# Patient Record
Sex: FEMALE | Race: WHITE | NOT HISPANIC OR LATINO | URBAN - METROPOLITAN AREA
[De-identification: names, ages, dates, MRNs, and addresses within clinical notes are randomized per-mention and may not be internally consistent; named-entity substitution may affect disease eponyms.]

---

## 2020-10-12 ENCOUNTER — OFFICE VISIT (OUTPATIENT)
Dept: URBAN - METROPOLITAN AREA CLINIC 83 | Facility: CLINIC | Age: 80
End: 2020-10-12
Payer: MEDICARE

## 2020-10-12 PROCEDURE — 67028 INJECTION EYE DRUG: CPT | Performed by: OPHTHALMOLOGY

## 2020-10-12 PROCEDURE — 92134 CPTRZ OPH DX IMG PST SGM RTA: CPT | Performed by: OPHTHALMOLOGY

## 2020-10-12 ASSESSMENT — INTRAOCULAR PRESSURE
OD: 13
OS: 12

## 2020-10-12 NOTE — IMPRESSION/PLAN
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 Right. Plan: Exam shows macular drusen, however, OCT confirms absence of IRF or SRF. The diagnosis, natural history, and prognosis of non-exudative AMD were discussed. AREDS-2 protocol antioxidant vitamins and Amsler grid self-monitoring were reviewed. The patient was encouraged to avoid smoking, wear UV protection, and call our office immediately upon noticing decreased vision or metamorphopsia.

## 2020-10-12 NOTE — IMPRESSION/PLAN
Impression: Exudative age-related macular degeneration, left eye, with active choroidal neovascularization: H35.3221. Left. s/p Avastin 8/10/20 Plan: Stable SRF/IRF on exam and OCT. I have recommended continuing anti-VEGF treatment at the current treatment interval to maintain vision. The diagnosis, natural history, and prognosis of exudative AMD, as well as the R/B/A of anti-VEGF were discussed. The patient understands that treatment may not improve vision but should reduce the risk of further visual loss. The patient understands that the use of Avastin is off-label and has not been approved for use in the eye. The patient elects to continue intravitreal Avastin treatment OS under OCT guidance. Avastin was performed successfully per protocol without complication. last full DFE OU 8/10/20 RTC 8-9 weeks OCT OU reeval Avastin

## 2020-12-14 ENCOUNTER — OFFICE VISIT (OUTPATIENT)
Dept: URBAN - METROPOLITAN AREA CLINIC 83 | Facility: CLINIC | Age: 80
End: 2020-12-14
Payer: MEDICARE

## 2020-12-14 DIAGNOSIS — H35.3221 EXUDATIVE AGE-RELATED MACULAR DEGENERATION, LEFT EYE, WITH ACTIVE CHOROIDAL NEOVASCULARIZATION: Primary | ICD-10-CM

## 2020-12-14 PROCEDURE — 92134 CPTRZ OPH DX IMG PST SGM RTA: CPT | Performed by: OPHTHALMOLOGY

## 2020-12-14 PROCEDURE — 67028 INJECTION EYE DRUG: CPT | Performed by: OPHTHALMOLOGY

## 2020-12-14 ASSESSMENT — INTRAOCULAR PRESSURE
OS: 12
OD: 13

## 2020-12-14 NOTE — IMPRESSION/PLAN
Impression: Exudative age-related macular degeneration, left eye, with active choroidal neovascularization: H35.3221. Left. s/p Avastin 10/12/2020
last full DFE OU 8/10/20 Plan: SRF/IRF on exam and OCT - appears stable compared to prior exam/OCT. I have recommended continuing anti-VEGF treatment at the current treatment interval to maintain vision. The diagnosis, natural history, and prognosis of exudative AMD, as well as the R/B/A of anti-VEGF were discussed. The patient understands that treatment may not improve vision but should reduce the risk of further visual loss. The patient understands that the use of Avastin is off-label and has not been approved for use in the eye. The patient elects to continue intravitreal Avastin treatment OS under OCT guidance. Avastin was performed successfully per protocol without complication. 

RTC 8-9 weeks OCT OU reeval Avastin, DFE OU (semiannual)

## 2021-02-22 ENCOUNTER — OFFICE VISIT (OUTPATIENT)
Dept: URBAN - METROPOLITAN AREA CLINIC 83 | Facility: CLINIC | Age: 81
End: 2021-02-22
Payer: MEDICARE

## 2021-02-22 PROCEDURE — 92134 CPTRZ OPH DX IMG PST SGM RTA: CPT | Performed by: OPHTHALMOLOGY

## 2021-02-22 PROCEDURE — 67028 INJECTION EYE DRUG: CPT | Performed by: OPHTHALMOLOGY

## 2021-02-22 PROCEDURE — 92014 COMPRE OPH EXAM EST PT 1/>: CPT | Performed by: OPHTHALMOLOGY

## 2021-02-22 ASSESSMENT — INTRAOCULAR PRESSURE
OS: 15
OD: 21

## 2021-02-22 NOTE — IMPRESSION/PLAN
Impression: Exudative age-related macular degeneration, left eye, with active choroidal neovascularization: H35.3221. Left. s/p Avastin 12/14/2020 Plan: Both eyes are due for full dilated semiannual exam today. There is persistent SRF/IRF on exam and OCT - appears improved compared to prior exam/OCT. I have recommended continuing anti-VEGF treatment at the current treatment interval to maintain vision. The diagnosis, natural history, and prognosis of exudative AMD, as well as the R/B/A of anti-VEGF were discussed. The patient understands that treatment may not improve vision but should reduce the risk of further visual loss. The patient understands that the use of Avastin is off-label and has not been approved for use in the eye. The patient elects to continue intravitreal Avastin treatment OS under OCT guidance. Avastin OS was performed successfully per protocol without complication. 

RTC 8-9 weeks OCT OU reeval Avastin,

## 2021-09-13 ENCOUNTER — OFFICE VISIT (OUTPATIENT)
Dept: URBAN - METROPOLITAN AREA CLINIC 83 | Facility: CLINIC | Age: 81
End: 2021-09-13
Payer: MEDICARE

## 2021-09-13 DIAGNOSIS — Z96.1 PRESENCE OF INTRAOCULAR LENS: ICD-10-CM

## 2021-09-13 PROCEDURE — 92134 CPTRZ OPH DX IMG PST SGM RTA: CPT | Performed by: OPHTHALMOLOGY

## 2021-09-13 PROCEDURE — 99213 OFFICE O/P EST LOW 20 MIN: CPT | Performed by: OPHTHALMOLOGY

## 2021-09-13 PROCEDURE — 67028 INJECTION EYE DRUG: CPT | Performed by: OPHTHALMOLOGY

## 2021-09-13 ASSESSMENT — INTRAOCULAR PRESSURE
OD: 18
OS: 12

## 2021-09-13 NOTE — IMPRESSION/PLAN
Impression: Exudative age-related macular degeneration, left eye, with active choroidal neovascularization: H35.3221. Left. s/p Avastin 5/10/21
last DFE OU 2/22/21 Plan: Pt lost to follow-up for 4 months. There is stable, mild SRF/IRF on exam and OCT - no change compared to prior exam/OCT. I have recommended continuing anti-VEGF treatment q8-9 weeks to maintain vision. The diagnosis, natural history, and prognosis of exudative AMD, as well as the R/B/A of anti-VEGF were discussed. The patient understands that treatment may not improve vision but should reduce the risk of further visual loss. The patient understands that the use of Avastin is off-label and has not been approved for use in the eye. The patient elects to continue intravitreal Avastin treatment OS under OCT guidance. Avastin OS was performed successfully per protocol without complication. 

RTC 8-9 weeks OCT OU reeval Avastin

## 2021-09-13 NOTE — IMPRESSION/PLAN
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 Right. Plan: Exam shows stable macular drusen. OCT confirms absence of IRF or SRF. The diagnosis, natural history, and prognosis of non-exudative AMD were discussed. AREDS-2 protocol antioxidant vitamins and Amsler grid self-monitoring were reviewed. The patient was encouraged to avoid smoking, wear UV protection, and call our office immediately upon noticing decreased vision or metamorphopsia.

## 2021-11-01 ENCOUNTER — OFFICE VISIT (OUTPATIENT)
Dept: URBAN - METROPOLITAN AREA CLINIC 83 | Facility: CLINIC | Age: 81
End: 2021-11-01

## 2021-11-01 DIAGNOSIS — H35.3112 NONEXUDATIVE AGE-RELATED MACULAR DEGENERATION, RIGHT EYE, INTERMEDIATE DRY STAGE: ICD-10-CM

## 2021-11-01 PROCEDURE — 67028 INJECTION EYE DRUG: CPT | Performed by: OPHTHALMOLOGY

## 2021-11-01 PROCEDURE — 92134 CPTRZ OPH DX IMG PST SGM RTA: CPT | Performed by: OPHTHALMOLOGY

## 2021-11-01 ASSESSMENT — INTRAOCULAR PRESSURE
OS: 14
OD: 13

## 2021-11-01 NOTE — IMPRESSION/PLAN
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 Right. Plan: Exam and OCT confirm absence of IRF or SRF. The diagnosis, natural history, and prognosis of non-exudative AMD were discussed. AREDS-2 protocol antioxidant vitamins and Amsler grid self-monitoring were reviewed. The patient was encouraged to avoid smoking, wear UV protection, and call our office immediately upon noticing decreased vision or metamorphopsia.

## 2021-11-01 NOTE — IMPRESSION/PLAN
Impression: Exudative age-related macular degeneration, left eye, with active choroidal neovascularization: H35.3221. Left. s/p Avastin 9/13/21
last DFE OU 9/13/21 Plan: Exam and OCT show stable, mild SRF/IRF - no change compared to prior exam/OCT. RPE scarring will limit her visual potential.  I have recommended continuing anti-VEGF treatment q8-9 weeks to maintain vision. The diagnosis, natural history, and prognosis of exudative AMD, as well as the R/B/A of anti-VEGF were discussed. The patient understands that treatment may not improve vision but should reduce the risk of further visual loss. The patient understands that the use of Avastin is off-label and has not been approved for use in the eye. The patient elects to continue intravitreal Avastin treatment OS under OCT guidance. Avastin OS was performed successfully per protocol without complication. 

RTC 8-9 weeks OCT OU reeval Avastin

## 2022-01-03 ENCOUNTER — OFFICE VISIT (OUTPATIENT)
Dept: URBAN - METROPOLITAN AREA CLINIC 83 | Facility: CLINIC | Age: 82
End: 2022-01-03
Payer: MEDICARE

## 2022-01-03 PROCEDURE — 92134 CPTRZ OPH DX IMG PST SGM RTA: CPT | Performed by: OPHTHALMOLOGY

## 2022-01-03 PROCEDURE — 67028 INJECTION EYE DRUG: CPT | Performed by: OPHTHALMOLOGY

## 2022-01-03 ASSESSMENT — INTRAOCULAR PRESSURE
OD: 15
OS: 13

## 2022-01-03 NOTE — IMPRESSION/PLAN
Impression: Exudative age-related macular degeneration, left eye, with active choroidal neovascularization: H35.3221. Left. s/p Avastin 11/01/21
last DFE OU 9/13/21 Plan: Exam and OCT demonstrate stable SRF/IRF OS. Subretinal scarring will limit her visual potential.  I have recommended continuing anti-VEGF treatment q8-9 weeks to maintain vision. The diagnosis, natural history, and prognosis of exudative AMD, as well as the R/B/A of anti-VEGF were discussed. The patient understands that treatment may not improve vision but should reduce the risk of further visual loss. The patient understands that the use of Avastin is off-label and has not been approved for use in the eye. The patient elects to continue intravitreal Avastin treatment OS under OCT guidance. Avastin OS was performed successfully per protocol without complication. 

RTC 8-9 weeks OCT OU reeval Avastin, DFE OU (semiannual)